# Patient Record
Sex: FEMALE | URBAN - METROPOLITAN AREA
[De-identification: names, ages, dates, MRNs, and addresses within clinical notes are randomized per-mention and may not be internally consistent; named-entity substitution may affect disease eponyms.]

---

## 2020-06-15 ENCOUNTER — APPOINTMENT (RX ONLY)
Dept: URBAN - METROPOLITAN AREA CLINIC 168 | Facility: CLINIC | Age: 20
Setting detail: DERMATOLOGY
End: 2020-06-15

## 2020-06-15 DIAGNOSIS — L30.8 OTHER SPECIFIED DERMATITIS: ICD-10-CM

## 2020-06-15 PROCEDURE — ? ADDITIONAL NOTES

## 2020-06-15 PROCEDURE — 99203 OFFICE O/P NEW LOW 30 MIN: CPT

## 2020-06-15 NOTE — PROCEDURE: ADDITIONAL NOTES
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 \\n\\nAlso, with GI symptoms and skin symptoms with initial improvement with Humira, though was only a few doses, if cocci titers found to have normalized, I think it would be reasonable to reinstitute Humira with Dr. Ernst and monitor for improvement in skin findings. If recurrent infections occur on Humira then perhaps could consider Stelara for Crohn's.\\n\\nTreatment of skin findings with topical steroids largely ineffective in recent past, but encouraged mother and group home attendant that she should continue with bleach baths to reduce skin infection risk. \\n\\nDiscussed with Dr. Haider who agrees with referral to immunology. Will contact Dr. Jackson to discuss. Mother is also in agreement with this referral and will update us on plans for Humira with GI based on cocci results that are pending.
Detail Level: Simple

## 2020-06-15 NOTE — HPI: RASH
What Type Of Note Output Would You Prefer (Optional)?: Bullet Format
Is The Patient Presenting As Previously Scheduled?: Yes
How Severe Is Your Rash?: severe
Is This A New Presentation, Or A Follow-Up?: Rash
Additional History: Her current skin issues began when 4-5 years ago she developed a malar rash, joint swelling, and so oxcarbazepime was d/c'ed, however symptoms did not resolve, and rash only worsened, becoming more and more pustular and wide spread over time. She subsequently developed progressive weight loss, severe abdominal pain, diarrhea, evaluated by GI and felt to most likely have Crohn's disease within the past  year. She has been admitted for recurrent skin infections, responsive to antibiotics, and pustular rashes, possibly pustular psoriasis as well. Dr. Haider has taken care of her at [facility] for many years, biopsies were non-diagnostic (I do not have copies of these). \\n\\nToday her skin has improved from her recent falre. Her mother notes that in general, when her abdominal symptoms flare, her skin tends to flare concomitantly. In the past triamcinolone for the body, and tacrolimus for face/axilla/groin involvement was helpful, however it is no longer relieving her symptoms. Her GI Dr. Ernst started her on Humira 4/2020, however it was given for only 3 weeks, and was held after she tested + for cocci. Repeat testing is pending and if negative she will restart Humira hopefully soon. Her mother notes that her skin did seem to improve some with the few doses of Humira she received. \\n\\nShe has been using the creams, Triamcinolone and tacrolimus,and she does bleach baths and will use Aquaphor.\\n\\nMom states in 2016 she started to develop “boils” under the arms and she would go to urgent care to drain them, several times has tested positive for MRSA.  Mom states Lupus runs on her side of the family. Her PCP is concerned she may have lupus as well, however rheumatology states she does not meet criteria. Initially it was thought to be drug induced, however d/c of oxcarbazipine did not result in improvement. She had been on this med since 2010, and rash began 2015/16. \\n\\nMom has not been able to associate flares with food neccessarily, but they have found she is severely lactose intolerant. She saw allergy in the past who did general allergy testing, but to her knowledge she has not had immunologic work up for immunodeficiency. \\n\\nShe was sent to genetics and found to have CHARGE syndrome, which is associated with combined T and B cell defects per a literature search.

## 2020-07-02 ENCOUNTER — RX ONLY (OUTPATIENT)
Age: 20
Setting detail: RX ONLY
End: 2020-07-02

## 2020-07-02 RX ORDER — TACROLIMUS 0.3 MG/G
1 OINTMENT TOPICAL BID
Qty: 1 | Refills: 1 | Status: ERX | COMMUNITY
Start: 2020-07-02

## 2020-07-13 ENCOUNTER — RX ONLY (OUTPATIENT)
Age: 20
Setting detail: RX ONLY
End: 2020-07-13

## 2020-07-13 RX ORDER — TACROLIMUS 0.3 MG/G
1 OINTMENT TOPICAL AS DIRECTED
Qty: 1 | Refills: 2 | Status: ERX

## 2020-08-19 ENCOUNTER — RX ONLY (OUTPATIENT)
Age: 20
Setting detail: RX ONLY
End: 2020-08-19

## 2020-08-19 RX ORDER — FLUOCINOLONE ACETONIDE 0.11 MG/ML
1 OIL TOPICAL AS DIRECTED
Qty: 1 | Refills: 1 | Status: ERX

## 2020-09-21 ENCOUNTER — RX ONLY (OUTPATIENT)
Age: 20
Setting detail: RX ONLY
End: 2020-09-21

## 2020-09-21 RX ORDER — TRIAMCINOLONE ACETONIDE 1 MG/G
1 CREAM TOPICAL BID
Qty: 1 | Refills: 2 | Status: ERX | COMMUNITY
Start: 2020-09-21

## 2020-10-13 ENCOUNTER — RX ONLY (OUTPATIENT)
Age: 20
Setting detail: RX ONLY
End: 2020-10-13

## 2020-10-13 RX ORDER — FLUOCINOLONE ACETONIDE 0.11 MG/ML
1 OIL TOPICAL BID
Qty: 1 | Refills: 1 | Status: ERX

## 2020-11-04 ENCOUNTER — RX ONLY (OUTPATIENT)
Age: 20
Setting detail: RX ONLY
End: 2020-11-04

## 2020-11-04 RX ORDER — TACROLIMUS 1 MG/G
1 OINTMENT TOPICAL BID
Qty: 1 | Refills: 1 | Status: ERX | COMMUNITY
Start: 2020-11-04

## 2020-12-22 ENCOUNTER — RX ONLY (OUTPATIENT)
Age: 20
Setting detail: RX ONLY
End: 2020-12-22

## 2020-12-22 RX ORDER — FLUOCINOLONE ACETONIDE 0.11 MG/ML
1 OIL TOPICAL AS DIRECTED
Qty: 1 | Refills: 1 | Status: ERX

## 2020-12-30 ENCOUNTER — APPOINTMENT (RX ONLY)
Dept: URBAN - METROPOLITAN AREA CLINIC 168 | Facility: CLINIC | Age: 20
Setting detail: DERMATOLOGY
End: 2020-12-30

## 2020-12-30 VITALS — TEMPERATURE: 97.7 F | TEMPERATURE: 98.4 F

## 2020-12-30 DIAGNOSIS — L20.89 OTHER ATOPIC DERMATITIS: ICD-10-CM | Status: IMPROVED

## 2020-12-30 PROCEDURE — 99213 OFFICE O/P EST LOW 20 MIN: CPT

## 2020-12-30 PROCEDURE — ? ADDITIONAL NOTES

## 2020-12-30 NOTE — HPI: RASH (ECZEMA)
How Severe Is Your Eczema?: mild
Is This A New Presentation, Or A Follow-Up?: Follow Up Eczema
Additional History: Patient presents for a follow up visit from the ER on 12/21/2020 after a flare began a week before. 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

## 2020-12-30 NOTE — PROCEDURE: ADDITIONAL NOTES
Additional Notes: Cleared nicely, skin is without any active inflammation today. Pt's caretaker state that she has been under reasonable control until recent flare, but cleared nicely with the Medrol and Bactrim. So continue the daily moisturization, bleach baths. And also strongly recommended to follow up with Dr. Jackson. I did write into her paperwork for her group home his office number so that they can call and clarify instructions for blood work and follow up with him. \\n
Detail Level: Simple

## 2021-01-12 ENCOUNTER — RX ONLY (OUTPATIENT)
Age: 21
Setting detail: RX ONLY
End: 2021-01-12

## 2021-01-12 RX ORDER — TACROLIMUS 1 MG/G
OINTMENT TOPICAL BID
Qty: 1 | Refills: 3 | Status: ERX

## 2021-01-28 ENCOUNTER — RX ONLY (OUTPATIENT)
Age: 21
Setting detail: RX ONLY
End: 2021-01-28

## 2021-01-28 RX ORDER — KETOCONAZOLE 20 MG/ML
1 SHAMPOO, SUSPENSION TOPICAL AS DIRECTED
Qty: 1 | Refills: 6 | Status: ERX

## 2021-02-10 ENCOUNTER — RX ONLY (OUTPATIENT)
Age: 21
Setting detail: RX ONLY
End: 2021-02-10

## 2021-02-10 RX ORDER — FLUOCINOLONE ACETONIDE 0.11 MG/ML
1 OIL TOPICAL BID
Qty: 1 | Refills: 3 | Status: ERX | COMMUNITY
Start: 2021-02-10

## 2021-02-10 RX ORDER — NYSTATIN 100000 [USP'U]/G
1 POWDER TOPICAL QD
Qty: 1 | Refills: 11 | Status: ERX | COMMUNITY
Start: 2021-02-10

## 2021-08-12 ENCOUNTER — APPOINTMENT (RX ONLY)
Dept: URBAN - METROPOLITAN AREA CLINIC 168 | Facility: CLINIC | Age: 21
Setting detail: DERMATOLOGY
End: 2021-08-12

## 2021-08-12 DIAGNOSIS — L20.89 OTHER ATOPIC DERMATITIS: ICD-10-CM | Status: WORSENING

## 2021-08-12 PROBLEM — L20.84 INTRINSIC (ALLERGIC) ECZEMA: Status: ACTIVE | Noted: 2021-08-12

## 2021-08-12 PROCEDURE — ? PRESCRIPTION

## 2021-08-12 PROCEDURE — ? ADDITIONAL NOTES

## 2021-08-12 PROCEDURE — 99214 OFFICE O/P EST MOD 30 MIN: CPT

## 2021-08-12 PROCEDURE — ? COUNSELING

## 2021-08-12 RX ORDER — SULFAMETHOXAZOLE AND TRIMETHOPRIM 800; 160 MG/1; MG/1
1 TABLET ORAL
Qty: 14 | Refills: 0 | Status: ERX | COMMUNITY
Start: 2021-08-12

## 2021-08-12 RX ORDER — TRIAMCINOLONE ACETONIDE 1 MG/G
CREAM TOPICAL BID
Qty: 1 | Refills: 0 | Status: ERX

## 2021-08-12 RX ADMIN — SULFAMETHOXAZOLE AND TRIMETHOPRIM 1: 800; 160 TABLET ORAL at 00:00

## 2021-08-12 ASSESSMENT — LOCATION SIMPLE DESCRIPTION DERM
LOCATION SIMPLE: ABDOMEN
LOCATION SIMPLE: RIGHT UPPER ARM
LOCATION SIMPLE: GROIN
LOCATION SIMPLE: LEFT THIGH
LOCATION SIMPLE: RIGHT THIGH
LOCATION SIMPLE: LEFT UPPER ARM

## 2021-08-12 ASSESSMENT — LOCATION DETAILED DESCRIPTION DERM
LOCATION DETAILED: RIGHT ANTERIOR PROXIMAL THIGH
LOCATION DETAILED: EPIGASTRIC SKIN
LOCATION DETAILED: RIGHT SUPRAPUBIC SKIN
LOCATION DETAILED: PERIUMBILICAL SKIN
LOCATION DETAILED: LEFT ANTERIOR PROXIMAL THIGH
LOCATION DETAILED: LEFT ANTERIOR DISTAL UPPER ARM
LOCATION DETAILED: LEFT RIB CAGE
LOCATION DETAILED: RIGHT ANTERIOR DISTAL UPPER ARM

## 2021-08-12 ASSESSMENT — LOCATION ZONE DERM
LOCATION ZONE: ARM
LOCATION ZONE: TRUNK
LOCATION ZONE: LEG

## 2021-08-12 NOTE — HPI: RASH
What Type Of Note Output Would You Prefer (Optional)?: Bullet Format
Is The Patient Presenting As Previously Scheduled?: No, they are a work-in
How Severe Is Your Rash?: moderate
Is This A New Presentation, Or A Follow-Up?: Rash
Additional History: Rash started 2 weeks ago before she was started on Stelara

## 2021-08-12 NOTE — PROCEDURE: ADDITIONAL NOTES
Detail Level: Simple
Render Risk Assessment In Note?: no
Additional Notes: Flare of eczematous dermatosis over the last 2 weeks, with likely secondary impetigenization. Will start 7 days of Bactrim (tolerated well in the past when given by ED) and triamcinolone BID (1 pound jar given so she can be generous with application).

## 2021-08-20 RX ORDER — SULFAMETHOXAZOLE AND TRIMETHOPRIM 800; 160 MG/1; MG/1
1 TABLET ORAL BID
Qty: 14 | Refills: 0 | Status: ERX